# Patient Record
Sex: MALE | Race: WHITE | NOT HISPANIC OR LATINO | URBAN - METROPOLITAN AREA
[De-identification: names, ages, dates, MRNs, and addresses within clinical notes are randomized per-mention and may not be internally consistent; named-entity substitution may affect disease eponyms.]

---

## 2023-03-22 ENCOUNTER — TELEPHONE ENCOUNTER (OUTPATIENT)
Dept: URBAN - METROPOLITAN AREA CLINIC 9 | Facility: CLINIC | Age: 73
End: 2023-03-22

## 2023-03-23 ENCOUNTER — WEB ENCOUNTER (OUTPATIENT)
Dept: URBAN - METROPOLITAN AREA CLINIC 9 | Facility: CLINIC | Age: 73
End: 2023-03-23

## 2023-03-23 ENCOUNTER — OFFICE VISIT (OUTPATIENT)
Dept: URBAN - METROPOLITAN AREA CLINIC 9 | Facility: CLINIC | Age: 73
End: 2023-03-23
Payer: COMMERCIAL

## 2023-03-23 ENCOUNTER — LAB OUTSIDE AN ENCOUNTER (OUTPATIENT)
Dept: URBAN - METROPOLITAN AREA CLINIC 9 | Facility: CLINIC | Age: 73
End: 2023-03-23

## 2023-03-23 ENCOUNTER — DASHBOARD ENCOUNTERS (OUTPATIENT)
Age: 73
End: 2023-03-23

## 2023-03-23 VITALS
DIASTOLIC BLOOD PRESSURE: 80 MMHG | WEIGHT: 266 LBS | HEIGHT: 69 IN | BODY MASS INDEX: 39.4 KG/M2 | SYSTOLIC BLOOD PRESSURE: 130 MMHG

## 2023-03-23 DIAGNOSIS — T85.590A BILIARY ANASTOMOTIC STENT EMBEDDED IN GI TRACT: ICD-10-CM

## 2023-03-23 DIAGNOSIS — K83.1 BILIARY STRICTURE: ICD-10-CM

## 2023-03-23 DIAGNOSIS — K80.50 CHOLEDOCHOLITHIASIS: ICD-10-CM

## 2023-03-23 PROBLEM — 235935001: Status: ACTIVE | Noted: 2023-03-23

## 2023-03-23 PROBLEM — 307132003: Status: ACTIVE | Noted: 2023-03-23

## 2023-03-23 PROCEDURE — 99204 OFFICE O/P NEW MOD 45 MIN: CPT | Performed by: STUDENT IN AN ORGANIZED HEALTH CARE EDUCATION/TRAINING PROGRAM

## 2023-03-23 RX ORDER — SUCRALFATE 1 G/1
TAKE ONE TABLET BY MOUTH FOUR TIMES A DAY TABLET ORAL
Qty: 120 UNSPECIFIED | Refills: 0 | Status: ACTIVE | COMMUNITY

## 2023-03-23 RX ORDER — CANDESARTAN CILEXETIL 32 MG/1
TABLET ORAL
Qty: 90 TABLET | Status: ACTIVE | COMMUNITY

## 2023-03-23 RX ORDER — PANTOPRAZOLE SODIUM 40 MG/1
TAKE ONE TABLET BY MOUTH ONE TIME DAILY TABLET, DELAYED RELEASE ORAL
Qty: 30 UNSPECIFIED | Refills: 0 | Status: ACTIVE | COMMUNITY

## 2023-03-23 NOTE — HPI-TODAY'S VISIT:
73 YO Male presents after CCY in 11/2022.  [place] fine, then in February 2023 started to have bloating and pain.  WEnt to ER, had ERCP with Dr. Jj with stent placement and spincterotomy.  Reportedly had ulceration.  AFter discharge from hospital, within 2 days started to have increased badominal pain and blaoting.  Reported choledocholithiasis. Had repeat ERCP with Dr. Silverio, stent removed with large metal stent placed.  Recommended to have EUS after procedure.  Feeling better now.  No visible jaundice.   ERCP op note reviewed, had bilairy stent removed and covered metal stent placed for biliary stricture.  Needs EUS for follow up.  States that he was told he needed stent removal and asking if EUS and stent removal can be done at same procedure.  Advised that Dr. Arriaga will follow up with patient regarding if stent can be removed during procedure per ASC polcy in outpatient setting.  ALARM SYMPTOMS --No odynophagia --No hematemesis --No melena / hematochezia --No unintentional weight loss --No iron deficiency anemia  PERTINENT GI FAMILY HISTORY Colon polyps:  no family history Colon cancer:  no family history   GASTROINTESTINAL PROCEDURE HISTORY Colonoscopy:	 --> 3 years ago EGD:   --2022  PERTINENT MEDICAL HISTORY Aspirin 81mg PO daily:   --Not Taking Other Blood Thinners:   Cardiac Disease:   --No History  Pulmonary Disease --No History  Abdominal Surgery & Hernia:  No History

## 2023-03-24 PROBLEM — 235921003: Status: ACTIVE | Noted: 2023-03-24

## 2023-03-24 PROBLEM — 235934002: Status: ACTIVE | Noted: 2023-03-24

## 2023-03-28 ENCOUNTER — TELEPHONE ENCOUNTER (OUTPATIENT)
Dept: URBAN - METROPOLITAN AREA CLINIC 7 | Facility: CLINIC | Age: 73
End: 2023-03-28

## 2023-03-30 ENCOUNTER — WEB ENCOUNTER (OUTPATIENT)
Dept: URBAN - METROPOLITAN AREA SURGERY CENTER 9 | Facility: SURGERY CENTER | Age: 73
End: 2023-03-30

## 2023-04-03 ENCOUNTER — OFFICE VISIT (OUTPATIENT)
Dept: URBAN - METROPOLITAN AREA SURGERY CENTER 9 | Facility: SURGERY CENTER | Age: 73
End: 2023-04-03
Payer: COMMERCIAL

## 2023-04-03 ENCOUNTER — CLAIMS CREATED FROM THE CLAIM WINDOW (OUTPATIENT)
Dept: URBAN - METROPOLITAN AREA CLINIC 4 | Facility: CLINIC | Age: 73
End: 2023-04-03
Payer: COMMERCIAL

## 2023-04-03 DIAGNOSIS — R93.3 ABN FINDINGS-GI TRACT: ICD-10-CM

## 2023-04-03 DIAGNOSIS — K31.7 BENIGN GASTRIC POLYP: ICD-10-CM

## 2023-04-03 DIAGNOSIS — K86.89 ACUTE PANCREATIC FLUID COLLECTION: ICD-10-CM

## 2023-04-03 DIAGNOSIS — K80.50 BILE DUCT CALCULUS: ICD-10-CM

## 2023-04-03 DIAGNOSIS — K31.7 POLYP OF STOMACH AND DUODENUM: ICD-10-CM

## 2023-04-03 PROCEDURE — 43239 EGD BIOPSY SINGLE/MULTIPLE: CPT | Performed by: INTERNAL MEDICINE

## 2023-04-03 PROCEDURE — 88305 TISSUE EXAM BY PATHOLOGIST: CPT | Performed by: PATHOLOGY

## 2023-04-03 PROCEDURE — 88312 SPECIAL STAINS GROUP 1: CPT | Performed by: PATHOLOGY

## 2023-04-03 PROCEDURE — 43237 ENDOSCOPIC US EXAM ESOPH: CPT | Performed by: INTERNAL MEDICINE

## 2023-04-03 RX ORDER — PANTOPRAZOLE SODIUM 40 MG/1
TAKE ONE TABLET BY MOUTH ONE TIME DAILY TABLET, DELAYED RELEASE ORAL
Qty: 30 UNSPECIFIED | Refills: 0 | Status: ACTIVE | COMMUNITY

## 2023-04-03 RX ORDER — SUCRALFATE 1 G/1
TAKE ONE TABLET BY MOUTH FOUR TIMES A DAY TABLET ORAL
Qty: 120 UNSPECIFIED | Refills: 0 | Status: ACTIVE | COMMUNITY

## 2023-04-03 RX ORDER — CANDESARTAN CILEXETIL 32 MG/1
TABLET ORAL
Qty: 90 TABLET | Status: ACTIVE | COMMUNITY

## 2023-04-14 ENCOUNTER — TELEPHONE ENCOUNTER (OUTPATIENT)
Dept: URBAN - METROPOLITAN AREA CLINIC 23 | Facility: CLINIC | Age: 73
End: 2023-04-14